# Patient Record
(demographics unavailable — no encounter records)

---

## 2024-10-24 NOTE — HISTORY OF PRESENT ILLNESS
[FreeTextEntry1] : Acne; seb derm; discoloration; brittle nails [de-identified] : #Acne f/u: patient on tret 0.025 and josue 50mg qd. Patient controlled  #Seb derm f/u: patient using ciclopirox shampoo tiw and mometasone solution biw. Much improved.  #Melasma on cheeks: improved with hydroquinone 4% cream x3 months.   #Patient also here for excess sweating of underarms with discoloration and odor.  Pt tried Drysol, but it burned and worsened discoloration Pt currently doesn't have insurance, so will apply for Botox when she gets insurance  #Brittle fingernails, x2 weeks. Pt picks off cuticles often   No personal or family hx of skin cancer Social hx: Slovak speaking;  ID [id number] ( services used). Works ; started cleaning business

## 2024-10-24 NOTE — ASSESSMENT
[FreeTextEntry1] : #Nail brittleness, distal fingernails - 2/2 trauma and xerosis - Recommend avoid picking at nails - Start urea cream (mixed with vaseline BID) - Nail hardener to polish - Wear non-latex gloves at work  #Acne - chronic; stable Hormonal (pt had hysterectomy 2 years ago) - Diagnosis, chronic nature, disease course, treatment options and goals of therapy discussed - Continue Tretinoin 0.025% cream pea size amount qhs to face - Continue spironolactone 50mg QD. SED   #Seborrheic dermatitis - chronic; stable - Diagnosis, chronic nature, disease course, treatment options and goals of therapy discussed - Continue ciclopirox shampoo TIW - Continue Mometasone 0.1% solution BIW or prn flares. SED  #Melasma - chronic; improved - Diagnosis, chronic nature, disease course, treatment options and goals of therapy discussed - c/w Hydroquinone 4% cream daily x 3 months on, 1 month off cycles as needed. HOLD OFF FOR NOW Photo protection emphasized spf 30+ with mineral component (zinc oxide and/or titanium dioxide).  Discussed risks and benefits of topical HQ, including risk of ochronosis   #Hyperhidrosis of axilla - chronic; flaring Failed drysol - Diagnosis, chronic nature, disease course, treatment options and goals of therapy discussed - Will start PA for botox approval once pt gets insurance (pt defers for now) 50u botox per axilla

## 2024-10-24 NOTE — PHYSICAL EXAM
[Alert] : alert [Oriented x 3] : ~L oriented x 3 [Well Nourished] : well nourished [Declined] : declined [FreeTextEntry3] : distal fingernails with traumatic splitting; moderate xerosis on distal fingertips and skin peeling on proximal nailfold

## 2024-12-24 NOTE — ASSESSMENT
[FreeTextEntry1] : 44-year-old woman with a history of chronic headaches, mixed headache type, anxiety, depression, chronic back pain restless leg syndrome. Doing well overall, symptoms pretty well-controlled, tolerating her medications well. Reviewed and discussed treatment, no changes at this time. Stressed importance of maintaining good muscle tone, discussed importance of regular stretching and strengthening exercises of the legs and lower back.  Proper lifting technique. Return to office, 4 to 6 months.

## 2025-04-24 NOTE — PHYSICAL EXAM
[Alert] : alert [Oriented x 3] : ~L oriented x 3 [Well Nourished] : well nourished [Declined] : declined [FreeTextEntry3] : distal fingernails with traumatic splitting and fingertips  brown patches bl cheeks

## 2025-04-24 NOTE — HISTORY OF PRESENT ILLNESS
[FreeTextEntry1] : Acne; seb derm; discoloration; brittle nails [de-identified] : #Acne f/u: patient on tret 0.025 and josue 50mg qd. Patient controlled, needs refills #Seb derm f/u: patient using ciclopirox shampoo tiw and mometasone solution biw. Much improved. #Melasma on cheeks: improved with hydroquinone 4% cream x3 months in the past. Flaring now #Itchy rash on shoulders, back, legs. Using moisturizer  Derm hx: hyperhidrosis of axilla No personal or family hx of skin cancer Social hx: Bahamian speaking. Works ; started cleaning business

## 2025-04-24 NOTE — ASSESSMENT
[FreeTextEntry1] : #Acne - chronic; stable Hormonal (pt had hysterectomy 2 years ago)  - Continue Tretinoin 0.025% cream pea size amount qhs to face - Continue spironolactone 50mg QD. SED   #Seborrheic dermatitis - chronic; stable  - Continue ciclopirox shampoo TIW - Continue Mometasone 0.1% solution BIW or prn flares. SED  #Melasma - chronic; flaring - Diagnosis, chronic nature, disease course, treatment options and goals of therapy discussed - RESTART Hydroquinone 4% cream daily x 3 months on, 1 month off cycles as needed.   Photo protection emphasized spf 30+ with mineral tinted component (titanium dioxide).  Discussed risks and benefits of topical HQ, including risk of ochronosis   #Hyperhidrosis of axilla - chronic; stable Failed drysol - Prev discussed PA for botox once pt gets insurance (pt defers for now) 50u botox per axilla  #Nail brittleness, distal fingernails - 2/2 trauma and xerosis - Recommend avoid picking at nails - Start vaseline BID - Nail hardener to polish - Wear non-latex gloves at work   RTC 1 yr

## 2025-06-27 NOTE — PHYSICAL EXAM
[General Appearance - Alert] : alert [General Appearance - In No Acute Distress] : in no acute distress [General Appearance - Well Nourished] : well nourished [General Appearance - Well Developed] : well developed [General Appearance - Well-Appearing] : healthy appearing [] : normal voice and communication [Oriented To Time, Place, And Person] : oriented to person, place, and time [Impaired Insight] : insight and judgment were intact [Affect] : the affect was normal [Mood] : the mood was normal [Memory Recent] : recent memory was not impaired [Memory Remote] : remote memory was not impaired [FreeTextEntry1] : Overweight

## 2025-06-27 NOTE — ASSESSMENT
[FreeTextEntry1] : 45-year-old woman with a history of chronic migraines, restless leg syndrome, anxiety depression, recently stopped her venlafaxine now having affecting her mood as well as paresthesias which could be related to suddenly stopping Effexor. Reviewed and discussed the importance of compliance, potential side effects of withdrawing medication specially the antidepressive, mood stabilizers.  If she wants to discontinue medication in the past the best ways to work with me and we can then taper down the medication.  Discussed the side effects of the SSRIs, a potential effect on weight gain.  She still wants to lose weight as she has gained a lot of weight over the last year or so. Plan: Will discontinue Effexor. Start Wellbutrin  mg once a day.  If she has no side effect, will call me back in about 3 to 4 weeks and we will decide whether to increase the dose or maintain this dose. For her restless leg, we will drop the Lyrica to 100 mg at bedtime if she does well we can then try 50 mg at bedtime. She will let me know in about 2 weeks. For migraines in the past she was on Qulipta which she stopped because of insurance reasons she is not that and the cost.  If the headaches were to start to rare themselves up again we can need to restart Qulipta or try different preventative although she has been on several preventatives in the past including Botox. Advised to maintain a headache diary. Return to office, 3 to 4 months.

## 2025-06-27 NOTE — HISTORY OF PRESENT ILLNESS
[FreeTextEntry1] : 45-year-old woman right-handed with a past medical history of chronic migraines, fibromyalgia, restless leg syndrome, chronic back pain, history of depression anxiety.  Last time seen in the office, December 2024. She reports over the last week or so has not been feeling well, she was prescribed venlafaxine  mg for her anxiety depression, as well as a migraine prophylaxis with very good results.  Mood improved, anxiety control, headaches were much better.  She is also on Lyrica 150 mg at night which she finds helps her discomfort in the legs. She reports because of continuous weight gain, she is up to 180 pounds she has been trying to lose weight she has tried multiple varieties of treatments without any improvement she decided to see if by discontinuing some of her medication she could do so.  So she stopped venlafaxine about a week to 10 days ago, and has not been feeling well.  She feels unfocused, mood is becoming an issue again, she is getting a numbness sensation sometimes the head sometimes throughout the body.  The migraines have not returned yet, she is also try lowering the Lyrica but she finds that if she does not take it again the comfortable leg feelings at night return and she can sleep.

## 2025-07-25 NOTE — PHYSICAL EXAM
[Alert] : alert [Oriented x 3] : ~L oriented x 3 [Well Nourished] : well nourished [Declined] : declined [FreeTextEntry3] : hyperpigmented patches improving on bl cheeks moist axilla

## 2025-07-25 NOTE — ASSESSMENT
[FreeTextEntry1] : #Acne - chronic; stable Hormonal (pt had hysterectomy 2 years ago)  - Continue Tretinoin 0.025% cream pea size amount qhs to face - Continue spironolactone 50mg QD. SED   #Seborrheic dermatitis - chronic; stable  - Continue ciclopirox shampoo TIW - Continue Mometasone 0.1% solution BIW or prn flares. SED  #Melasma - chronic; flaring - Diagnosis, chronic nature, disease course, treatment options and goals of therapy discussed - Start SM cream BID x 3 months on, 1 month off cycles as needed.   Hydroquinone: 12% Kojic Acid: 6% Niacinamide: 2% Vitamin C: 1% Photo protection emphasized spf 30+ with mineral tinted component (titanium dioxide).  Discussed risks and benefits of topical HQ, including risk of ochronosis   #Hyperhidrosis of axilla - chronic; flaring Failed drysol -  Discussed risks, benefits and alternatives of Botox injection procedure with patient. Patient understands and accepts the risks, including risk of infection, bleeding, scar, compensatory hyperhidrosis and temporary injection site muscle weakness. Patient denies any history of neuromuscular disorders such as ALS, myasthenia gravis, [place]. Patient denies being pregnant or trying to get pregnant.  - The patient appeared to understand the risks and wished to proceed with the treatment. Following informed consent the patient was placed in a supine position.  - A 100 unit vial of Botox was diluted with 5 mL preservative-free (non-bacteriostatic) normal saline. Bilateral axilla were cleansed with 4% chlorhexidine gluconate liquid cleanser. 50 units of Botox were administered to each axilla for a total of administered 100 units (The affected areas were treated in a grid like pattern with approximately 0.1 cc per injection). The patient tolerated the procedure without any adverse events (minimal bloodloss). Following the procedure the patient ambulated to the exit and was discharged in stable condition.   - Patient understands that they should experience 95% reduction in sweating within approximately 48 hours and full effect within approximately one week. Patient understands that duration of effects is approximately 3-6 months. Patient advised to return to clinic as necessary for complications of pain, redness, swelling, purulence. Lot [id number] 4/2026  RTC 3-4 months botox

## 2025-07-25 NOTE — HISTORY OF PRESENT ILLNESS
[FreeTextEntry1] : Acne; seb derm; discoloration  [de-identified] : #Acne f/u: patient on tret 0.025 and josue 50mg qd. Patient controlled #Seb derm f/u: patient using ciclopirox shampoo tiw and mometasone solution biw. Much improved. #Melasma on cheeks: not improving with hydroquinone 4% cream  #Hyperhidrosis on axilla, here to start Botox   No personal or family hx of skin cancer Social hx: Ethiopian speaking. Works ; started cleaning business